# Patient Record
Sex: FEMALE | Race: WHITE | NOT HISPANIC OR LATINO | Employment: OTHER | URBAN - METROPOLITAN AREA
[De-identification: names, ages, dates, MRNs, and addresses within clinical notes are randomized per-mention and may not be internally consistent; named-entity substitution may affect disease eponyms.]

---

## 2018-07-10 NOTE — PATIENT DISCUSSION
OS 1st. OK for all options. SYM Goal OD: TBD OS: roxanne vs CV OD: -2.00 OS: roxanne vs B+ roxanne OU.

## 2018-09-07 NOTE — PATIENT DISCUSSION
Certain medications including Flomax and other similar medications may cause the iris to become floppy during the cataract surgery and may add complexity to the surgery and increase the risk for complications.

## 2018-09-07 NOTE — PATIENT DISCUSSION
Patient educated to use artificial tears at least 4 times a day. We will schedule repeat measurements prior to surgery.

## 2018-10-02 NOTE — PATIENT DISCUSSION
ok to proceed with IOL OD due to FD.  Dec for Sx OD made today and goal will be for TMF OD, goal of +3.25.

## 2018-10-17 NOTE — PATIENT DISCUSSION
Patient informed on neuroadaptation, CESAR/VA, increase near lighting, near focal point, and symfony vs tmf.

## 2019-01-21 NOTE — PROCEDURE NOTE: SURGICAL
<p>Prior to commencing surgery patient identification, surgical procedure, site, and side were confirmed by Dr. Breonna Nieves. Following topical proparacaine anesthesia, the patient was positioned at the YAG laser, a contact lens coupled to the cornea with methylcellulose and an axial posterior capsulotomy performed without complication using 3.0 Mj x 23. Excess methylcellulose was washed from the eye, one drop of Alphagan was instilled and the patient returned to the holding area having tolerated the procedure well and without complication. </p><p>MRN [id number]</p>

## 2019-06-20 NOTE — PATIENT DISCUSSION
Patient informed that Advance iol will correct majority of her daily activities and she confirms that they do.

## 2020-11-10 NOTE — PROCEDURE NOTE: CLINICAL
PROCEDURE NOTE: Punctal Plugs, Quintess Dissolvable ([id number], [id number]) Bilateral Lower Lids. Diagnosis: Dry Eye Syndrome. Prior to treatment, the risks/benefits/alternatives were discussed. The patient wished to proceed with procedure. Temporary collagen plugs were inserted. Patient tolerated procedure well. There were no complications. Post procedure instructions given. Rene Almazan

## 2021-12-09 NOTE — PATIENT DISCUSSION
Recommended observation. Spiritual Care Partner Volunteer visited patient in [facility] on 12.9.21.     Documented by Kevon Price, Staff , on 12.9.21  Please call MOOKIE [phone]) to page  if needed

## 2021-12-28 NOTE — PROCEDURE NOTE: CLINICAL
PROCEDURE NOTE: Punctal Plugs, Emeritabronwyn Pratt ([id number], [id number]) OU. Diagnosis: Dry Eye Syndrome. Prior to treatment, the risks/benefits/alternatives were discussed. The patient wished to proceed with procedure. Temporary collagen plugs were inserted. Patient tolerated procedure well. There were no complications. Post procedure instructions given. Blanca Hammond

## 2022-05-03 ENCOUNTER — COMPREHENSIVE EXAM (OUTPATIENT)
Dept: URBAN - METROPOLITAN AREA CLINIC 36 | Facility: CLINIC | Age: 62
End: 2022-05-03

## 2022-05-03 DIAGNOSIS — H04.123: ICD-10-CM

## 2022-05-03 DIAGNOSIS — Z98.890: ICD-10-CM

## 2022-05-03 DIAGNOSIS — H52.7: ICD-10-CM

## 2022-05-03 DIAGNOSIS — Z83.511: ICD-10-CM

## 2022-05-03 DIAGNOSIS — D31.32: ICD-10-CM

## 2022-05-03 PROCEDURE — 92015 DETERMINE REFRACTIVE STATE: CPT

## 2022-05-03 PROCEDURE — 92014 COMPRE OPH EXAM EST PT 1/>: CPT

## 2022-05-03 ASSESSMENT — VISUAL ACUITY
OD_SC: J8
OS_CC: J3
OD_SC: 20/25-1
OS_SC: J10
OD_CC: J3
OS_SC: 20/20

## 2022-05-03 ASSESSMENT — TONOMETRY
OS_IOP_MMHG: 17
OD_IOP_MMHG: 17

## 2022-12-14 NOTE — PROCEDURE NOTE: CLINICAL
PROCEDURE NOTE: Punctal Plugs, Dissolvable OU. Anesthesia: Proparacaine 0.5%. Prior to treatment, the risks/benefits/alternatives were discussed. The patient wished to proceed with procedure. 1 drop of Proparacaine 0.5% was instilled. Puncta was dilated with punctal dilator. Dissolvable punctal plugs were inserted. Size/location of plugs inserted: Inserted oasis softplug extended duration 180 pp BLL 0.5 mm OU. No complication. s. . The patient tolerated the procedure well. There were no complications. Post procedure instructions given. Ted Wall

## 2023-05-03 ENCOUNTER — COMPREHENSIVE EXAM (OUTPATIENT)
Dept: URBAN - METROPOLITAN AREA CLINIC 36 | Facility: CLINIC | Age: 63
End: 2023-05-03

## 2023-05-03 DIAGNOSIS — D31.32: ICD-10-CM

## 2023-05-03 DIAGNOSIS — Z98.890: ICD-10-CM

## 2023-05-03 DIAGNOSIS — H04.123: ICD-10-CM

## 2023-05-03 DIAGNOSIS — Z83.511: ICD-10-CM

## 2023-05-03 DIAGNOSIS — H52.7: ICD-10-CM

## 2023-05-03 PROCEDURE — 92015 DETERMINE REFRACTIVE STATE: CPT

## 2023-05-03 PROCEDURE — 92014 COMPRE OPH EXAM EST PT 1/>: CPT

## 2023-05-03 ASSESSMENT — VISUAL ACUITY
OS_SC: 20/25+2
OD_SC: 20/25-1
OD_CC: J2
OS_CC: J2
OS_SC: J8
OD_SC: J8

## 2023-05-03 ASSESSMENT — TONOMETRY
OS_IOP_MMHG: 17
OD_IOP_MMHG: 17

## 2024-04-17 NOTE — PATIENT DISCUSSION
Anticoagulation Progress Note    Indication:Factor V Leiden mutation (CMD) [D68.51]   History of DVT (deep vein thrombosis) [Z86.718]   Deep vein thrombosis (DVT) of proximal lower extremity, unspecified chronicity, unspecified laterality (CMD) [I82.4Y9]   Long term (current) use of anticoagulants [Z79.01]  Goal INR: 2.0 - 3.0 but manage to low end of range (2.0 - 2.5) due to hx of recurrent episodes of epistaxis.  Duration: long term  Referring Provider: Dr. Nino  Order Expiration Date: 3/15/25  Supervising Provider: Dr. Donald  Pertinent History: Multiple LLE DVT with thrombophlebitis in the past with Factor V Leiden on chronic coumadin. Patient states she gets nosebleeds easily when INR is elevated, which is why she has a range of 2.0-2.5    Assessment:  (-) missed doses:   (+) extra doses:  Took an extra 2 mg on 4/9/24 per [facility] request  (-) significant medication changes (RX, OTC, Herbal):   (+) High risk maintenance medications: Levothyroxine, plavix  (-) Vitamin K / dietary changes (Vitamin K goal: random cups/week):   (-) bleeding / bruising:   (-) alcohol intake: occasional  (-) falls / injury:   (-) acute illness:   (-) procedures / hospitalization / ER visits:        (+) Other: Patient had a steroid injection in Left hip on 4/10/24.    Plan (1mg tablets)  INR Result: 2.4  The INR result for today was therapeutic based on an INR goal of 2.0- 2.5.  Etiology: Followed plan of care  Recommended Dose: Maintain dose of 3 mg daily.    Follow-Up: 8 days per patient request.  Comments: Previous INR was 1.7 on 4/9/24- took an extra 2 mg x 1 surinder dn dose maintained. PM doser.     Patient prefers lancet set to shortest depth.  Patient declines bandaids.    Counseling  Counseled about signs/symptoms of bruising/bleeding and appropriate management  Counseled about signs/symptoms of thrombosis and/or stroke and when to seek emergent care  Stressed importance of compliance with exact recommended dosage regimen  Stressed  Patient made aware of 24/7 emergency services. importance of compliance with consistent vitamin K intake  Discussed measures to reduce risk for falls and appropriate action when serious injury occurs  Strongly advised to limit/avoid alcohol consumption  Stressed importance of adherence with recommended lab monitoring  Instructed to notify clinic about medication changes or health status changes  Instructed to notify clinic about scheduled procedures  Discussed risk of thrombosis and/or bleeding with inappropriate anticoagulant use and monitoring frequency    Provided patient/caregiver with written and verbal dosing instructions, patient/caregiver verbalized understanding.

## 2024-05-03 ENCOUNTER — COMPREHENSIVE EXAM (OUTPATIENT)
Dept: URBAN - METROPOLITAN AREA CLINIC 36 | Facility: CLINIC | Age: 64
End: 2024-05-03

## 2024-05-03 DIAGNOSIS — H52.7: ICD-10-CM

## 2024-05-03 DIAGNOSIS — Z98.890: ICD-10-CM

## 2024-05-03 DIAGNOSIS — Z83.511: ICD-10-CM

## 2024-05-03 DIAGNOSIS — H04.123: ICD-10-CM

## 2024-05-03 DIAGNOSIS — D31.32: ICD-10-CM

## 2024-05-03 PROCEDURE — 92014 COMPRE OPH EXAM EST PT 1/>: CPT

## 2024-05-03 PROCEDURE — 92015 DETERMINE REFRACTIVE STATE: CPT

## 2024-05-03 ASSESSMENT — TONOMETRY
OD_IOP_MMHG: 16
OS_IOP_MMHG: 16

## 2024-05-03 ASSESSMENT — VISUAL ACUITY
OS_CC: J2
OS_SC: 20/20-2
OD_CC: J3
OD_SC: 20/25+2

## 2025-01-09 NOTE — PATIENT DISCUSSION
Orders:  •  Lipid panel; Future   Discussed the importance of blood sugar control in the prevention of ocular complications.

## 2025-05-06 ENCOUNTER — COMPREHENSIVE EXAM (OUTPATIENT)
Age: 65
End: 2025-05-06

## 2025-05-06 DIAGNOSIS — H04.123: ICD-10-CM

## 2025-05-06 DIAGNOSIS — Z83.511: ICD-10-CM

## 2025-05-06 DIAGNOSIS — D31.32: ICD-10-CM

## 2025-05-06 DIAGNOSIS — H52.7: ICD-10-CM

## 2025-05-06 DIAGNOSIS — Z98.890: ICD-10-CM

## 2025-05-06 PROCEDURE — 92014 COMPRE OPH EXAM EST PT 1/>: CPT

## 2025-05-06 PROCEDURE — 92015 DETERMINE REFRACTIVE STATE: CPT
